# Patient Record
Sex: FEMALE | Race: WHITE | URBAN - METROPOLITAN AREA
[De-identification: names, ages, dates, MRNs, and addresses within clinical notes are randomized per-mention and may not be internally consistent; named-entity substitution may affect disease eponyms.]

---

## 2020-02-20 ENCOUNTER — TELEPHONE (OUTPATIENT)
Dept: TRANSPLANT | Facility: CLINIC | Age: 35
End: 2020-02-20

## 2020-02-20 NOTE — TELEPHONE ENCOUNTER
"Close Window   Print This Page   Expand All  Collapse All  [facility] BREEZE  [id number]      LIVING KIDNEY DONOR EVALUATION  Donor First Name Julia Donor CURLY    Donor Last Name Link Completed 2/15/2020 3:21 PM    1985 Record ID [id number]   BREEZE Screen PASSED     Intended Recipient  Recipient First Name Kim Recipient [email or web address]    Recipient Last Name Magnolia Relationship Close Friend   Recipient  1996 Recipient Diagnosis    Recipient's ABO      Donor Information  Age 34 Gender Female   Ht 160 cm (5' 3'') Race    Wt 59.8 kg (132 lbs) Ethnicity Not /   BMI 23.40 kg/m  Preferred Language English      Required No     Blood Type Unknown   Demographics  Home Address [address] [place], [place] [address]e [facility] # [phone]   [facility] Type [facility] #    [facility] Code [address] Type    Country [place] Preferred Contact day Mon, Fri, Th, Wed, Tue   Email [email or web address] Preferred Contact time 1:00 PM-4:00 PM   &&   Donor's Medical Information  Medical History Dysfunctional Uterine Bleeding Medications None Reported   Surgical History None Reported Allergies NKDA   Social History EtOH: Occasional (1-2 drinks/week)   Illicit Drug Use: Current: Marijuana   Tobacco: Remote; Quit ; ( ppd x 4 years) Self-Reported Functional Status \"I am able to participate in moderate recreational activities like golf, double tennis, dancing, throwing a baseball or football\"   Family Medical History Cancer (denies)   Diabetes (denies)   Heart Disease (denies)   Hypertension (denies)   Kidney Disease (denies)   Kidney Stones (denies) Exercise Frequency Exercise (>3 times per week)   Review of Organ Systems  Review of Systems Airway or Lungs: No   Blood Disorder: No   Cancer: No   Diabetes,Thyroid,Adrenal,Endocrine Disorder: No   Digestive or Liver: No   Female Health: Yes   Heart or Circulatory System: No   Immune Diseases: No   Kidneys and Bladder: No "   Muscles,Bones,Joints: No   Neuro: No   Psych: No   &&   Donor's Social Information  Marital Status  Living Accommodation Owns own home/apartment   Level of 's or technical degree complete Living Arrangement With spouse   Employment Status Full Time Concerns: health and life insurance No   Employer [employer] Concerns: job security and lost income No   Occupation      Medical Insurance Status Has medical insurance     High Risk Behavior  High Risk Behaviors Blood transfusion < 12 months. (NO)   Commercial sex < 12 months. (NO)   Illicit IV drug use < 5yrs. (NO)   Other high risk sexual contact < 12 months. (NO)   Reason for Donation  Referral Tx Candidate Reason for Donation Because my friends needs a kidney and I would love to see if I can help her.   Permission to Disclose Inquiry Yes Patient Comments    Donor Motivation Level Unsure, has questions     PCP Contact  PCP Name    PCP [facility]    PCP State    PCP Phone    Emergency Contact  First Name Estrella First Name Alyce   Last Name Link Last Name Jese   Phone # [phone] Phone # [phone]   Phone Type Mobile Phone Type Mobile   Relationship Spouse Relationship Mother   Office Use  Reviewed By    Reviewed 2/20/2020 4:16 PM   Admin Folder Archive   Comments    Lost for Followup    Extended Comments    BREEZE ID [email or web address]:[id number] survey status completed   Activity History  Call  Task    Due Date 2/20/2020   Last Modified Date/Time 2/20/2020 2:53 PM   Comments

## 2020-02-21 DIAGNOSIS — Z00.5 TRANSPLANT DONOR EVALUATION: Primary | ICD-10-CM

## 2020-02-21 NOTE — TELEPHONE ENCOUNTER
Unknown abo. Interested in PEP. Close friend. [place] to [place] from [facility] in 2000.Had tests in [facility] prior to coming for [place] Disease.Will send info/forms/orders.